# Patient Record
(demographics unavailable — no encounter records)

---

## 2024-10-30 NOTE — HISTORY OF PRESENT ILLNESS
[FreeTextEntry1] : Cherelle Rowe carries diagnoses of interstitial cystitis bladder pain syndrome, pelvic floor dysfunction, and a solitary right kidney (kidney donor). Patient also has a history of a Bosniak 2 renal cyst.  With reference to symptoms related to interstitial cystitis/bladder pain syndrome, she has still been managing flares with instillation therapy and the use of Urelle dosing. Using Urelle, she is only using 1-2 tabs per day and only for several days at a time. The patient uses her diazepam also very sparingly and only for severe flares related to pelvic floor type symptoms. GUPI shows a pain score of 4/10. She feels that she is emptying her bladder well and overall she is improved and stable.  With reference to the patient's upper tracts, we have been following with renal ultrasound on an annual basis. the diagnosis of Bosniak 2 cyst was made at [facility]. MRI from October 2021 showed a septated cystic lesion to be smooth-walled with an enhancing septa measuring 2.4 x 1.9 cm. Left renal ultrasound 5/15/23, showed 1.7 x 2.4 x 1.5 cm thinly septated upper pole cyst without discernible internal vascularity. We will continue to follow and she will have another follow-up renal ultrasound between now and her next visit in about 6 months. On additional note, patient is on topiramate for migraines and fibromyalgia and she had no evidence of stone disease over the past many years. As well urinalyses have been consistently negative.  In summary Sherrell is stable urologically. The patient continues dosing with Urelle on a as needed basis and uses diazepam sparingly.  With this therapy she is essentially asymptomatic with reference to bladder base discomfort.  Her biggest concern recently is the use of Urelle in the face of rizatriptan.  Her concern was potential serotonin syndrome.  Although I think the likelihood of developing serotonin syndrome on 1 tablet of Urelle per day is quite low, the risk is still present.  She may elect to switch over to Pyridium dosing as needed.  The patient to be returning in about 6 months for reevaluation.

## 2025-06-02 NOTE — HISTORY OF PRESENT ILLNESS
[FreeTextEntry1] : Diagnoses of interstitial cystitis bladder pain syndrome, pelvic floor dysfunction, and a solitary right kidney (kidney donor). Patient also has a history of a Bosniak 2 renal cyst.  With reference to symptoms related to interstitial cystitis/bladder pain syndrome, she has still been managing flares with instillation therapy and the use of Urelle dosing.  The patient is barely ever using Urelle at this point.  The patient uses her diazepam also very sparingly and only for severe flares related to pelvic floor type symptoms. GUPI shows a pain score of 3/10. She feels that she is emptying her bladder well and overall she is improved and stable.  With reference to the patient's upper tracts, we have been following with renal ultrasound on an annual basis. the diagnosis of Bosniak 2 cyst was made at [facility]. MRI from October 2021 showed a septated cystic lesion to be smooth-walled with an enhancing septa measuring 2.4 x 1.9 cm. Left renal ultrasound 5/15/23, showed 1.7 x 2.4 x 1.5 cm thinly septated upper pole cyst without discernible internal vascularity. We will continue to follow and she will have another follow-up renal ultrasound between now and her next visit in about 6 months. On additional note, patient is on topiramate for migraines and fibromyalgia and she had no evidence of stone disease over the past many years. As well urinalyses have been consistently negative.  Today we have elected to move forward with follow-up renal ultrasound and patient will call for those results.  Basic metabolic panel also obtained and urinalysis was sent off.  Patient to call for all results later this week or early next week.  Otherwise return 1 year or as needed.